# Patient Record
Sex: FEMALE | Race: WHITE | NOT HISPANIC OR LATINO | Employment: OTHER | URBAN - METROPOLITAN AREA
[De-identification: names, ages, dates, MRNs, and addresses within clinical notes are randomized per-mention and may not be internally consistent; named-entity substitution may affect disease eponyms.]

---

## 2017-06-26 ENCOUNTER — OFFICE VISIT (OUTPATIENT)
Dept: URGENT CARE | Facility: URGENT CARE | Age: 47
End: 2017-06-26
Payer: MEDICARE

## 2017-06-26 VITALS — OXYGEN SATURATION: 95 % | TEMPERATURE: 98.8 F | HEART RATE: 91 BPM

## 2017-06-26 DIAGNOSIS — R56.9 INCREASING FREQUENCY OF SEIZURE ACTIVITY (H): Primary | ICD-10-CM

## 2017-06-26 PROCEDURE — 99202 OFFICE O/P NEW SF 15 MIN: CPT | Performed by: PHYSICIAN ASSISTANT

## 2017-06-26 RX ORDER — CITALOPRAM HYDROBROMIDE 10 MG/1
10 TABLET ORAL DAILY
COMMUNITY

## 2017-06-26 RX ORDER — LEVETIRACETAM 250 MG/1
250 TABLET ORAL 2 TIMES DAILY
COMMUNITY
End: 2022-01-01

## 2017-06-26 RX ORDER — DIVALPROEX SODIUM 125 MG/1
125 CAPSULE, COATED PELLETS ORAL 3 TIMES DAILY
COMMUNITY

## 2017-06-26 RX ORDER — CARBAMAZEPINE 200 MG/1
200 TABLET ORAL 3 TIMES DAILY
COMMUNITY
End: 2022-01-01

## 2017-06-26 RX ORDER — LEVOCARNITINE 330 MG/1
TABLET ORAL 3 TIMES DAILY
COMMUNITY

## 2017-06-26 NOTE — MR AVS SNAPSHOT
"              After Visit Summary   2017    Loan Panda    MRN: [id number]           Patient Information     Date Of Birth          1970        Visit Information        Provider Department      2017 11:00 AM Darlene Gross PA-C [facility]        Today's Diagnoses     Increasing frequency of seizure activity (H)    -  1       Follow-ups after your visit        Who to contact     If you have questions or need follow up information about today's clinic visit or your schedule please contact [facility] directly at [phone].  Normal or non-critical lab and imaging results will be communicated to you by Screenhart, letter or phone within 4 business days after the clinic has received the results. If you do not hear from us within 7 days, please contact the clinic through Screenhart or phone. If you have a critical or abnormal lab result, we will notify you by phone as soon as possible.  Submit refill requests through [employer] or call your pharmacy and they will forward the refill request to us. Please allow 3 business days for your refill to be completed.          Additional Information About Your Visit        MyChart Information     [employer] lets you send messages to your doctor, view your test results, renew your prescriptions, schedule appointments and more. To sign up, go to [email or web address] . Click on \"Log in\" on the left side of the screen, which will take you to the Welcome page. Then click on \"Sign up Now\" on the right side of the page.     You will be asked to enter the access code listed below, as well as some personal information. Please follow the directions to create your username and password.     Your access code is: [email or web address]  Expires: 2017  5:15 PM     Your access code will  in 90 days. If you need help or a new code, please call your [facility] or [phone].        Care EveryWhere ID     This is your Care EveryWhere ID. " This could be used by other organizations to access your [facility] medical records  [id number]        Your Vitals Were     Pulse Temperature Pulse Oximetry             91 98.8  F (37.1  C) (Axillary) 95%          Blood Pressure from Last 3 Encounters:   No data found for BP    Weight from Last 3 Encounters:   No data found for Wt              Today, you had the following     No orders found for display       Primary Care Provider    None       No address on file        Equal Access to Services     ELIZABETH LOPEZROWENA : Hadii aad ku hadasho Soomaali, waaxda luqadaha, qaybta kaalmada adeegyada, waxay idiin hayaan adeeg indra ladevann . So [facility] [phone].    ATENCIÓN: Si habla español, tiene a santoyo disposición servicios gratuitos de asistencia lingüística. Llame al [phone].    We comply with applicable federal civil rights laws and [place] laws. We do not discriminate on the basis of race, color, national origin, age, disability sex, sexual orientation or gender identity.            Thank you!     Thank you for choosing [facility]  for your care. Our goal is always to provide you with excellent care. Hearing back from our patients is one way we can continue to improve our services. Please take a few minutes to complete the written survey that you may receive in the mail after your visit with us. Thank you!             Your Updated Medication List - Protect others around you: Learn how to safely use, store and throw away your medicines at www.disposemymeds.org.          This list is accurate as of: 6/26/17  5:15 PM.  Always use your most recent med list.                   Brand Name Dispense Instructions for use Diagnosis    ACETAMINOPHEN PO           calcium-vitamin D 600-400 MG-UNIT per tablet    CALTRATE     Take 1 tablet by mouth 2 times daily        carBAMazepine 200 MG tablet    TEGretol     Take 200 mg by mouth 3 times daily        citalopram 10 MG tablet    celeXA     Take 10 mg by mouth  daily        CULTURELLE PO      Take by mouth 2 times daily        DEBROX 6.5 % otic solution   Generic drug:  carbamide peroxide      3 drops 2 times daily        divalproex 125 MG CR capsule    DEPAKOTE SPRINKLE     Take 125 mg by mouth 3 times daily        DOCUSATE SODIUM PO      Take 100 mg by mouth        KEPPRA 250 MG tablet   Generic drug:  levETIRAcetam      Take 250 mg by mouth 2 times daily        levOCARNitine 330 MG tablet    CARNITOR     Take by mouth 3 times daily        OMEPRAZOLE PO      Take 20 mg by mouth        PHENOBARBITAL PO      Take 32.4 mg by mouth 2 times daily        VITAMIN D (CHOLECALCIFEROL) PO      Take 2,000 Units by mouth daily

## 2017-06-26 NOTE — PROGRESS NOTES
SUBJECTIVE:                                                    Loan Panda is a 46 year old female who presents to clinic today for the following health issues:    Seizure Disorders: Yes, on meds.  Patient was hospitalized due to urianry track infection. She was treated and discharged from [facility].   Patient is here today to recheck for UTI and she continue to have seizure activity.     Seen at [facility]  5/24/2017. Hospitalized. UTI treated with Keflex. H/O urinary incontinence. Seizures still ongoing despite uti treatment. Also h/o hyponatremia. Caretakers wondering if she still has an UTI.      Allergies not on file    No past medical history on file.      No current outpatient prescriptions on file prior to visit.  No current facility-administered medications on file prior to visit.     Social History   Substance Use Topics     Smoking status: Not on file     Smokeless tobacco: Not on file     Alcohol use Not on file       ROS:  General: negative for fever  ABD: Denies abd pain  : as above    OBJECTIVE:  Pulse 91  Temp 98.8  F (37.1  C) (Axillary)  SpO2 95%   General:   awake, alert, and cooperative.  NAD.   Head: Normocephalic, atraumatic.  Eyes: Conjunctiva clear, non icteric.   ABD: soft, no tenderness to palpation , no rigidity, guarding or rebound . No CVAT  Neuro: Alert and oriented - normal speech.     ASSESSMENT:    ICD-10-CM    1. Increasing frequency of seizure activity (H) R56.9        PLAN: [facility], called. Needs straight  cath, electrolytes, seizure med levels done, etc. Caretakers understand and agree.    Darlene Gross PA-C

## 2019-03-24 ENCOUNTER — OFFICE VISIT (OUTPATIENT)
Dept: URGENT CARE | Facility: URGENT CARE | Age: 49
End: 2019-03-24
Payer: MEDICARE

## 2019-03-24 VITALS — DIASTOLIC BLOOD PRESSURE: 78 MMHG | SYSTOLIC BLOOD PRESSURE: 107 MMHG | HEART RATE: 75 BPM | TEMPERATURE: 97.3 F

## 2019-03-24 DIAGNOSIS — T14.8XXA BLISTER: Primary | ICD-10-CM

## 2019-03-24 PROCEDURE — 99213 OFFICE O/P EST LOW 20 MIN: CPT | Performed by: NURSE PRACTITIONER

## 2019-03-24 RX ORDER — LORAZEPAM 2 MG/ML
2 CONCENTRATE ORAL
COMMUNITY
Start: 2017-11-03 | End: 2022-01-01

## 2019-03-24 RX ORDER — BISACODYL 10 MG
10 SUPPOSITORY, RECTAL RECTAL
COMMUNITY
Start: 2017-03-21

## 2019-03-24 RX ORDER — LACTULOSE 10 G/15ML
20 SOLUTION ORAL
COMMUNITY
End: 2022-01-01

## 2019-03-24 RX ORDER — SILVER SULFADIAZINE 10 MG/G
CREAM TOPICAL 2 TIMES DAILY
Qty: 50 G | Refills: 0 | Status: SHIPPED | OUTPATIENT
Start: 2019-03-24 | End: 2019-03-24

## 2019-03-24 RX ORDER — SILVER SULFADIAZINE 10 MG/G
CREAM TOPICAL 2 TIMES DAILY
Qty: 50 G | Refills: 0 | Status: SHIPPED | OUTPATIENT
Start: 2019-03-24 | End: 2022-01-01

## 2019-03-24 ASSESSMENT — ENCOUNTER SYMPTOMS
CONSTITUTIONAL NEGATIVE: 1
RESPIRATORY NEGATIVE: 1
GASTROINTESTINAL NEGATIVE: 1

## 2019-03-24 NOTE — PATIENT INSTRUCTIONS
Patient Education     Blister (Adult)  A blister is a raised area of skin with clear, watery fluid inside. A blister can occur when the skin is damaged. Blisters can hurt when they are pressed, or if they break open.  Blisters can be caused in many ways. This can happen if the skin is rubbed too hard or often. Or they can occur if the skin is hurt by the sun, a virus, or even a medicine.  Most blisters need little treatment. They often dry up and go away in a few days to weeks after the cause is stopped. A blister may need to be cleaned. A broken (open) blister may be bandaged to prevent infection. Blisters caused by insect bites or drug reactions may be more serious. These should be looked at by a healthcare provider.  Home care  Your healthcare provider may prescribe pain medicine. He or she may also prescribe an antibiotic cream or ointment to put on an open blister. Follow all instructions when using these medicines.  General care:    Follow all instructions on how to care for the blister. If a bandage was put on, change the bandage as instructed. .    If the blister breaks, the area will leak a clear fluid for a day or 2. Wash the area with soap and water every day or as advised by your healthcare provider.    You may use over-the-counter pain medicines to control pain, unless another medicine was prescribed. If you have chronic liver or kidney disease, talk with your healthcare provider before using these medicines. Also talk with your provider if you've had a stomach ulcer or gastrointestinal bleeding.  Follow-up care  Follow up with your healthcare provider, or as advised.  When to seek medical advice  Call your healthcare provider right away if any of these occur:    Fever of 100.4 F (38 C) or higher    Redness or swelling that is new or gets worse    Foul-smelling fluid leaking from the blister    Pain doesn t go away, or gets worse    Increase in size of the blister    Blister doesn t get better after  several days  Date Last Reviewed: 9/1/2016 2000-2018 [employer]. [address] [place] [address], [place] [address]. All rights reserved. This information is not intended as a substitute for professional medical care. Always follow your healthcare professional's instructions.            OVERWEIGHT

## 2019-03-24 NOTE — PROGRESS NOTES
SUBJECTIVE:   Loan Panda is a 48 year old female presenting with a chief complaint of   Chief Complaint   Patient presents with     Breast Problem     sore on it and left arm       She is an established patient of [facility].    Rash    Onset of 2 blisters noted by care home staff today with unknown cause or etiology.   Current and Associated symptoms: blistering, patient unable to verbalize pain or other associated symptoms  Location of the rash: 2 separate areas. LEFT breast and LEFT forearm.  Previous history of a similar rash? No  Recent exposure history: none known  Denies exposure to: dietary change, environmental allergens, medications, new household products, new skincare products and viral illness  Associated symptoms include: nothing.  Treatment measures tried include: none    Review of Systems   Constitutional: Negative.    HENT: Negative.    Respiratory: Negative.    Gastrointestinal: Negative.    Genitourinary: Negative.    Skin: Positive for rash.       No past medical history on file.  History reviewed. No pertinent family history.  Current Outpatient Medications   Medication Sig Dispense Refill     ACETAMINOPHEN PO        bisacodyl (DULCOLAX) 10 MG suppository Place 10 mg rectally       calcium-vitamin D (CALTRATE) 600-400 MG-UNIT per tablet Take 1 tablet by mouth 2 times daily       carbamide peroxide (DEBROX) 6.5 % otic solution 3 drops 2 times daily       DOCUSATE SODIUM PO Take 100 mg by mouth       Lactobacillus Rhamnosus, GG, (CULTURELLE PO) Take by mouth 2 times daily       lactulose (CHRONULAC) 10 GM/15ML solution Take 20 g by mouth       levETIRAcetam (KEPPRA) 250 MG tablet Take 250 mg by mouth 2 times daily       levOCARNitine (CARNITOR) 330 MG tablet Take by mouth 3 times daily       LORazepam (ATIVAN) 2 MG/ML (HIGH CONC) solution 2 mg by Per G Tube route       Nutritional Supplements (JEVITY 1.5 SCOTT) LIQD Take 1 Can by mouth 3 times daily (with meals)       OMEPRAZOLE PO Take 20 mg by  mouth       SB POLYETHYLENE GLYCOL 3350 PO Take 17 g by mouth       silver sulfADIAZINE (SILVADENE) 1 % external cream Apply topically 2 times daily To affected areas 50 g 0     SODIUM PHOSPHATES RE        valproic acid (DEPAKENE) 250 MG/5ML syrup 750 mg at 0800, 750 mg at 1400, 1000 mg at 2200       VITAMIN D, CHOLECALCIFEROL, PO Take 2,000 Units by mouth daily       carBAMazepine (TEGRETOL) 200 MG tablet Take 200 mg by mouth 3 times daily       citalopram (CELEXA) 10 MG tablet Take 10 mg by mouth daily       divalproex (DEPAKOTE SPRINKLE) 125 MG CR capsule Take 125 mg by mouth 3 times daily       PHENOBARBITAL PO        PHENOBARBITAL PO Take 32.4 mg by mouth 2 times daily       Social History     Tobacco Use     Smoking status: Never Smoker     Smokeless tobacco: Never Used     Tobacco comment: non smoking household   Substance Use Topics     Alcohol use: Not on file       OBJECTIVE  /78 (BP Location: Right arm, Patient Position: Chair, Cuff Size: Adult Large)   Pulse 75   Temp 97.3  F (36.3  C) (Oral)        Physical Exam   Constitutional: No distress.   Cardiovascular: Normal rate, regular rhythm, normal heart sounds and intact distal pulses. Exam reveals no gallop and no friction rub.   No murmur heard.  Pulmonary/Chest: Effort normal and breath sounds normal. No respiratory distress. She has no wheezes.   Neurological: She is alert.   Skin: Skin is warm. Capillary refill takes less than 2 seconds. Rash noted.   LEFT breast at 7 o'clock noted with one fluid filled blister about 2x1cm in size, no surrounding erythema or warmth. LEFT inner forearm distal to wrist noted with a 3x4cm circular area with open blister, central area erythemic and moist with surrounding erythemic border no warmth, scant serous drainage noted.      Psychiatric: She has a normal mood and affect.       Labs:  No results found for this or any previous visit (from the past 24 hour(s)).      ASSESSMENT:      ICD-10-CM    1. Blister  T14.8XXA silver sulfADIAZINE (SILVADENE) 1 % external cream     DISCONTINUED: silver sulfADIAZINE (SILVADENE) 1 % external cream        Medical Decision Making:    Differential Diagnosis:  Rash: Cellulitis  Dermatitis  Blister    Serious Comorbid Conditions:  Adult:  multiple    PLAN: Discussed given unknown etiology most closely represents a blister related to heat exposure or friction. Advised caregiver need for close monitoring of wound, application of thin layer of silvadene twice daily and follow up with PCP early next for re-evaluation. Education provided. Patient agreed to the plan of care with no further questions or concerns.     Doreen Worrell, APRN, CNP      Patient Instructions     Patient Education     Blister (Adult)  A blister is a raised area of skin with clear, watery fluid inside. A blister can occur when the skin is damaged. Blisters can hurt when they are pressed, or if they break open.  Blisters can be caused in many ways. This can happen if the skin is rubbed too hard or often. Or they can occur if the skin is hurt by the sun, a virus, or even a medicine.  Most blisters need little treatment. They often dry up and go away in a few days to weeks after the cause is stopped. A blister may need to be cleaned. A broken (open) blister may be bandaged to prevent infection. Blisters caused by insect bites or drug reactions may be more serious. These should be looked at by a healthcare provider.  Home care  Your healthcare provider may prescribe pain medicine. He or she may also prescribe an antibiotic cream or ointment to put on an open blister. Follow all instructions when using these medicines.  General care:    Follow all instructions on how to care for the blister. If a bandage was put on, change the bandage as instructed. .    If the blister breaks, the area will leak a clear fluid for a day or 2. Wash the area with soap and water every day or as advised by your healthcare provider.    You may  use over-the-counter pain medicines to control pain, unless another medicine was prescribed. If you have chronic liver or kidney disease, talk with your healthcare provider before using these medicines. Also talk with your provider if you've had a stomach ulcer or gastrointestinal bleeding.  Follow-up care  Follow up with your healthcare provider, or as advised.  When to seek medical advice  Call your healthcare provider right away if any of these occur:    Fever of 100.4 F (38 C) or higher    Redness or swelling that is new or gets worse    Foul-smelling fluid leaking from the blister    Pain doesn t go away, or gets worse    Increase in size of the blister    Blister doesn t get better after several days  Date Last Reviewed: 9/1/2016 2000-2018 [employer]. [address] [place] [address], [place] [address]. All rights reserved. This information is not intended as a substitute for professional medical care. Always follow your healthcare professional's instructions.

## 2022-01-01 ENCOUNTER — NURSE TRIAGE (OUTPATIENT)
Dept: NURSING | Facility: CLINIC | Age: 52
End: 2022-01-01
Payer: MEDICARE

## 2022-01-01 ENCOUNTER — OFFICE VISIT (OUTPATIENT)
Dept: URGENT CARE | Facility: URGENT CARE | Age: 52
End: 2022-01-01
Payer: MEDICARE

## 2022-01-01 VITALS
OXYGEN SATURATION: 92 % | WEIGHT: 185 LBS | DIASTOLIC BLOOD PRESSURE: 80 MMHG | SYSTOLIC BLOOD PRESSURE: 126 MMHG | TEMPERATURE: 97.8 F | HEART RATE: 79 BPM

## 2022-01-01 DIAGNOSIS — T14.8XXA LOCAL INFECTION OF WOUND: Primary | ICD-10-CM

## 2022-01-01 DIAGNOSIS — L08.9 LOCAL INFECTION OF WOUND: Primary | ICD-10-CM

## 2022-01-01 LAB
BACTERIA WND CULT: ABNORMAL
GRAM STAIN RESULT: ABNORMAL
GRAM STAIN RESULT: ABNORMAL

## 2022-01-01 PROCEDURE — 87070 CULTURE OTHR SPECIMN AEROBIC: CPT | Performed by: EMERGENCY MEDICINE

## 2022-01-01 PROCEDURE — 87077 CULTURE AEROBIC IDENTIFY: CPT | Performed by: EMERGENCY MEDICINE

## 2022-01-01 PROCEDURE — 87186 SC STD MICRODIL/AGAR DIL: CPT | Performed by: EMERGENCY MEDICINE

## 2022-01-01 PROCEDURE — 87205 SMEAR GRAM STAIN: CPT | Performed by: EMERGENCY MEDICINE

## 2022-01-01 PROCEDURE — 99203 OFFICE O/P NEW LOW 30 MIN: CPT | Performed by: EMERGENCY MEDICINE

## 2022-01-01 RX ORDER — WHITE PETROLATUM 1 G/G
JELLY TOPICAL
COMMUNITY

## 2022-01-01 RX ORDER — ACETAMINOPHEN 160 MG/5ML
1000 SUSPENSION ORAL
COMMUNITY

## 2022-01-01 RX ORDER — VALPROIC ACID 250 MG/5ML
750 SOLUTION ORAL
COMMUNITY
Start: 2021-03-31

## 2022-01-01 RX ORDER — BENTONITE
20 POWDER (GRAM) MISCELLANEOUS
COMMUNITY

## 2022-01-01 RX ORDER — LORAZEPAM 2 MG/ML
2 CONCENTRATE ORAL
COMMUNITY
Start: 2022-01-01

## 2022-01-01 RX ORDER — LEVETIRACETAM 100 MG/ML
SOLUTION ORAL
COMMUNITY
Start: 2022-01-01

## 2022-01-01 RX ORDER — CLOBAZAM 10 MG/1
10 TABLET ORAL
COMMUNITY

## 2022-01-01 RX ORDER — MENTHOL AND ZINC OXIDE .44; 20.625 G/100G; G/100G
OINTMENT TOPICAL
COMMUNITY

## 2022-01-01 RX ORDER — LACOSAMIDE 150 MG/1
150 TABLET ORAL
COMMUNITY

## 2022-01-01 RX ORDER — SCOLOPAMINE TRANSDERMAL SYSTEM 1 MG/1
PATCH, EXTENDED RELEASE TRANSDERMAL
COMMUNITY
Start: 2022-01-01

## 2022-01-01 RX ORDER — DIVALPROEX SODIUM 125 MG/1
8 CAPSULE, COATED PELLETS ORAL
COMMUNITY
End: 2022-01-01

## 2022-01-01 RX ORDER — FUROSEMIDE 20 MG
TABLET ORAL
COMMUNITY
Start: 2022-01-01

## 2022-06-15 NOTE — PATIENT INSTRUCTIONS
Follow up closely with the wound/ostomy team for evaluation. Go to the ER if fever or spreading redness develops.

## 2022-06-15 NOTE — PROGRESS NOTES
Assessment & Plan     Diagnosis:    (T14.8XXA,  L08.9) Local infection of wound  (primary encounter diagnosis)  Comment: left elbow  Plan: Wound Aerobic Bacterial Culture Routine with         Gram Stain, CANCELED: Wound Aerobic Bacterial         Culture Routine with Gram Stain      Medical Decision Making:  Loan Panda is a 51 year old female who presents with caregiver for evaluation of an infected chronic left elbow wound.  The patient's caregiver notes that for the past month and a half patient has been on various antibiotics to help treat a infected wound ulcer and has been going through [facility] ostomy [facility] for treatment.  They are unable to obtain cultures, and they are requesting a wound culture be obtained here today.  Wound culture was obtained, I discussed my concern for osteomyelitis given the ulcer bed, well-appearing healing with granulation tissue noted, I cannot rule out more nefarious pathology without x-ray and blood work.  Per mother and caregiver, they only want a wound culture performed here and will defer the rest of the work-up to the ostomy care team.  They will be contacted with wound culture results.  All questions answered.    Patient's caregiver voices understanding and agreement with the plan including reasons to go to the ER immediately as well as to be seen by a more consistent care-giver, such as their PCP, if the symptoms persist more than 3 days.       David Solis PA-C  [facility] URGENT [facility]ARE    Subjective     Loan Panda is a 51 year old female who presents to clinic today for the following health issues:  Chief Complaint   Patient presents with     Wound Check     Pt has a wound on her left elbow that needs to be culture.       HPI  Patient's caregiver states that the patient has been dealing with a wound on her left elbow that has been going on for a couple of months, patient has been on various antibiotics over this time and is being treated through [facility], has local wound care nurse and daily dressing changes performed.  Caregiver notes there is no fevers, increased redness around the site, just does not appear to be healing entirely.  They note the patient has been continues to bend at the elbow slightly and have noted no red streaks going up the arm. Requesting wound culture and wound culture alone be performed here.    Review of Systems    See HPI    Objective      Vitals: /80 (BP Location: Right arm, Patient Position: Sitting, Cuff Size: Adult Regular)   Pulse 79   Temp 97.8  F (36.6  C) (Tympanic)   Wt 83.9 kg (185 lb)   SpO2 92%   Resp: 15    Patient Vitals for the past 24 hrs:   BP Temp Temp src Pulse SpO2 Weight   06/15/22 1200 126/80 97.8  F (36.6  C) Tympanic 79 92 % 83.9 kg (185 lb)       Vital signs reviewed by: David Solis PA-C    Physical Exam   Constitutional: Patient is alert and cooperative. No acute distress.  Mouth: Mucous membranes are moist. Normal tongue and tonsil. Posterior oropharynx is clear.  Cardiovascular: Regular rate and rhythm  Pulmonary/Chest: Lungs are clear to auscultation throughout. Effort normal. No respiratory distress. No wheezes, rales or rhonchi.  Neurological: Sensation intact to touch.  MSK: Left olecranon with half-dollar sized ulcer bed with granulation tissue and slightly erythematous borders.  Slightly warm to palpation.  Appears to be nontender.  No lymphangitis, notable swelling.  Passive range of motion grossly intact at the elbow.    Labs:    Wound culture: Pending      David Solis PA-C, Annmarie 15, 2022

## 2022-06-20 NOTE — TELEPHONE ENCOUNTER
Pt asks for wound culture results.    Pt has a follow up appointment with [facility] tomorrow.    FNA advised that results are in and can get it from [facility] urgent care.    FNA called  [facility] line and notified them of pt picking up printed result today.    6/18/2022  8:16 AM CDT Back to Top        Please call patient and advise her that her wound culture is complete.  Please print out the wound culture and they can pick it up at our .     RERE Walter RN/[facility] Nurse Advisor      Additional Information    [1] Other NON-URGENT information for PCP AND [2] does not require PCP response    Protocols used: PCP CALL - NO TRIAGE-A-